# Patient Record
Sex: FEMALE | Race: WHITE | NOT HISPANIC OR LATINO | URBAN - METROPOLITAN AREA
[De-identification: names, ages, dates, MRNs, and addresses within clinical notes are randomized per-mention and may not be internally consistent; named-entity substitution may affect disease eponyms.]

---

## 2023-01-07 ENCOUNTER — APPOINTMENT (OUTPATIENT)
Dept: URGENT CARE | Age: 23
End: 2023-01-07

## 2023-01-07 DIAGNOSIS — Z56.6 OTHER PHYSICAL AND MENTAL STRAIN RELATED TO WORK: ICD-10-CM

## 2023-01-07 SDOH — HEALTH STABILITY - MENTAL HEALTH: OTHER PHYSICAL AND MENTAL STRAIN RELATED TO WORK: Z56.6

## 2023-01-10 LAB
GAMMA INTERFERON BACKGROUND BLD IA-ACNC: 0.02 IU/ML
M TB IFN-G BLD-IMP: NEGATIVE
M TB IFN-G CD4+ BCKGRND COR BLD-ACNC: 0 IU/ML
M TB IFN-G CD4+ BCKGRND COR BLD-ACNC: 0.01 IU/ML
MITOGEN IGNF BCKGRD COR BLD-ACNC: >10 IU/ML

## 2024-06-03 ENCOUNTER — APPOINTMENT (OUTPATIENT)
Dept: URGENT CARE | Age: 24
End: 2024-06-03

## 2025-01-27 ENCOUNTER — OFFICE VISIT (OUTPATIENT)
Dept: URGENT CARE | Age: 25
End: 2025-01-27
Payer: COMMERCIAL

## 2025-01-27 ENCOUNTER — HOSPITAL ENCOUNTER (EMERGENCY)
Facility: HOSPITAL | Age: 25
Discharge: HOME | End: 2025-01-27
Attending: EMERGENCY MEDICINE
Payer: COMMERCIAL

## 2025-01-27 VITALS
SYSTOLIC BLOOD PRESSURE: 93 MMHG | DIASTOLIC BLOOD PRESSURE: 57 MMHG | TEMPERATURE: 98.5 F | HEART RATE: 93 BPM | OXYGEN SATURATION: 98 % | RESPIRATION RATE: 18 BRPM

## 2025-01-27 VITALS
SYSTOLIC BLOOD PRESSURE: 120 MMHG | OXYGEN SATURATION: 100 % | DIASTOLIC BLOOD PRESSURE: 78 MMHG | RESPIRATION RATE: 18 BRPM | HEART RATE: 125 BPM | TEMPERATURE: 99 F

## 2025-01-27 DIAGNOSIS — K52.9 GASTROENTERITIS: Primary | ICD-10-CM

## 2025-01-27 DIAGNOSIS — R11.2 NAUSEA AND VOMITING, UNSPECIFIED VOMITING TYPE: Primary | ICD-10-CM

## 2025-01-27 LAB — GLUCOSE SERPL-MCNC: 129 MG/DL (ref 65–140)

## 2025-01-27 PROCEDURE — 99284 EMERGENCY DEPT VISIT MOD MDM: CPT | Performed by: EMERGENCY MEDICINE

## 2025-01-27 PROCEDURE — 99215 OFFICE O/P EST HI 40 MIN: CPT

## 2025-01-27 PROCEDURE — 99283 EMERGENCY DEPT VISIT LOW MDM: CPT

## 2025-01-27 PROCEDURE — 82948 REAGENT STRIP/BLOOD GLUCOSE: CPT

## 2025-01-27 RX ORDER — ONDANSETRON 8 MG/1
8 TABLET, ORALLY DISINTEGRATING ORAL ONCE
Status: COMPLETED | OUTPATIENT
Start: 2025-01-27 | End: 2025-01-27

## 2025-01-27 RX ORDER — ONDANSETRON 4 MG/1
4 TABLET, FILM COATED ORAL EVERY 8 HOURS PRN
Qty: 20 TABLET | Refills: 0 | Status: SHIPPED | OUTPATIENT
Start: 2025-01-27

## 2025-01-27 RX ORDER — ONDANSETRON 4 MG/1
4 TABLET, ORALLY DISINTEGRATING ORAL ONCE
Status: COMPLETED | OUTPATIENT
Start: 2025-01-27 | End: 2025-01-27

## 2025-01-27 RX ADMIN — ONDANSETRON 8 MG: 8 TABLET, ORALLY DISINTEGRATING ORAL at 20:39

## 2025-01-27 RX ADMIN — ONDANSETRON 4 MG: 4 TABLET, ORALLY DISINTEGRATING ORAL at 09:58

## 2025-01-27 NOTE — PROGRESS NOTES
[facility]        NAME: Miriam Payan is a 25 y.o. female  : 2000    MRN: [id number]  DATE: 2025  TIME: 11:07 AM    Assessment and Plan   Nausea and vomiting, unspecified vomiting type [R11.2]  1. Nausea and vomiting, unspecified vomiting type  ondansetron (ZOFRAN-ODT) dispersible tablet 4 mg    Fingerstick Glucose (POCT)    ondansetron (ZOFRAN) 4 mg tablet    Transfer to other facility    CANCELED: POCT urine dip    CANCELED: POCT urine HCG        Zofran 4mg PO given in office for active vomiting.  BS 129mg/dl. /62,   Pt with 2x diarrhea stools while in office; incontinent of stool.    Will check urine.Unable to provide urine specimen for HCG.  PO fluids following zofran admin; pt tolerated sips of ginger ale, continues with dizziness while supine.   Pt reports feeling slighting improved, VS: BP 93/57; HR 93  Recommend pt have further evaluation and possible IV hydration in ED.  Recommend pt not drive due to dizziness; requires further evaluation.  PT calling for ride.  Pt unsure if she will go to ED, would like to continue PO fluids at home with zofran.   Requested work note, same provided.   Strict ER precautions.   Pt family arrived to take her to ED. ADT order placed.  Pt ambulated out of  independently without incident.    Patient Instructions       Follow up with PCP in 3-5 days.  Proceed to  ER if symptoms worsen.    If tests have been performed at [facility], our office will contact you with results if changes need to be made to the care plan discussed with you at the visit.  You can review your full results on [facility].    Chief Complaint     Chief Complaint   Patient presents with   • Vomiting   • Dizziness   • Nausea   • Abdominal Pain   • Fatigue   • Generalized Body Aches     Symptoms started this morning.          History of Present Illness       Pt is a 25 year old female presenting with 5 hours of nausea, vomiting, chills, and diarrhea.  She  reports diffuse abd cramping and body aches.  She has not taken anything for her symptoms.  She reports dizziness and feeling lightheaded.  She denies sick contacts, has not eating out in restaurants or eaten anything abnormal for her.  LMP:  12/27/2024    Vomiting   Associated symptoms include abdominal pain, chills and dizziness. Pertinent negatives include no fever.   Dizziness  Associated symptoms include abdominal pain, chills, fatigue, nausea and vomiting. Pertinent negatives include no fever.   Nausea  Associated symptoms include abdominal pain, chills, fatigue, nausea and vomiting. Pertinent negatives include no fever.   Abdominal Pain  Associated symptoms include nausea and vomiting. Pertinent negatives include no fever.   Fatigue  Associated symptoms include abdominal pain, chills, fatigue, nausea and vomiting. Pertinent negatives include no fever.   Generalized Body Aches  Associated symptoms include fatigue, abdominal pain, nausea and vomiting. Pertinent negatives include no fever.       Review of Systems   Review of Systems   Constitutional:  Positive for chills and fatigue. Negative for fever.   Gastrointestinal:  Positive for abdominal pain, nausea and vomiting.   Neurological:  Positive for dizziness and light-headedness.         Current Medications       Current Outpatient Medications:   •  ondansetron (ZOFRAN) 4 mg tablet, Take 1 tablet (4 mg total) by mouth every 8 (eight) hours as needed for nausea or vomiting, Disp: 20 tablet, Rfl: 0  No current facility-administered medications for this visit.    Current Allergies     Allergies as of 01/27/2025 - Reviewed 01/27/2025   Allergen Reaction Noted   • Amoxicillin Hives 01/27/2025   • Penicillins Hives 01/27/2025            The following portions of the patient's history were reviewed and updated as appropriate: allergies, current medications, past family history, past medical history, past social history, past surgical history and problem list.      History reviewed. No pertinent past medical history.    History reviewed. No pertinent surgical history.    History reviewed. No pertinent family history.      Medications have been verified.        Objective   BP 93/57   Pulse 93   Temp 98.5 °F (36.9 °C)   Resp 18   SpO2 98%   No LMP recorded.       Physical Exam     Physical Exam  Vitals and nursing note reviewed.   Constitutional:       General: She is not in acute distress.     Appearance: She is well-developed and normal weight. She is ill-appearing.   HENT:      Head: Normocephalic.   Cardiovascular:      Rate and Rhythm: Tachycardia present.      Heart sounds: Normal heart sounds.   Pulmonary:      Effort: Pulmonary effort is normal.      Breath sounds: Normal breath sounds.   Abdominal:      General: Abdomen is flat. Bowel sounds are normal.      Palpations: Abdomen is soft.      Tenderness: There is generalized abdominal tenderness. There is no right CVA tenderness, left CVA tenderness, guarding or rebound. Negative signs include Mariscal's sign, Rovsing's sign, McBurney's sign, psoas sign and obturator sign.   Neurological:      Mental Status: She is alert.

## 2025-01-27 NOTE — LETTER
January 27, 2025     Patient: Miriam Payan   YOB: 2000   Date of Visit: 1/27/2025       To Whom it May Concern:    Miriam Payan was seen in my clinic on 1/27/2025. She may return to work on 1/29/2025 .    If you have any questions or concerns, please don't hesitate to call.         Sincerely,          SUZIE Lam        CC: No Recipients

## 2025-01-27 NOTE — PATIENT INSTRUCTIONS
Please go to [facility] Emergency Department for further evaluation of your dizziness with your nausea and vomiting.

## 2025-01-28 NOTE — ED ATTENDING ATTESTATION
"I, Edy Davis MD, saw and evaluated the patient. I have discussed the patient with the resident and agree with the resident's findings, Plan of Care, and MDM as documented in the resident's note, except where noted. All available labs and Radiology studies were reviewed.  I was present for key portions of any procedure(s) performed by the resident and I was immediately available to provide assistance.    At this point I agree with the current assessment done in the Emergency Department.  I have conducted an independent evaluation of this patient a history and physical is as follows:    26 yo female with no significant past medical history presents to the ED for evaluation of nausea, vomiting, and diarrhea x 1 day. The patient reports multiple episodes of NBNB vomiting and loose watery diarrhea since waking this morning. (+) Generalized abdominal \"cramping\", much improved since onset. (+) Myalgias and chills this morning but now resolved. The patient was evaluated in an urgent care this morning for the symptoms. She received Zofran and was able to tolerate some liquids prior to discharge. She was instructed to come to the ED if she vomited again however. This afternoon she vomited once more (NBNB) so she followed instructions and came to the ED. No fevers. No more episodes of diarrhea. She denies dizziness and lightheadedness. No other specific complaints.    ROS: per resident physician note    Gen: NAD, AA&Ox3  HEENT: PERRL, EOMI  Neck: supple  CV: (+) tachycardia  Lungs: CTA B/L  Abdomen: soft, (+) mild epigastric tenderness, no rebound or guarding  Ext: no swelling or deformity  Neuro: 5/5 strength all extremities, sensation grossly intact  Skin: no rash    ED Course  The patient is comfortable appearing with stable vital signs other than a mild tachycardia. Exam is significant for some slight epigastric tenderness. Presentation is most consistent with a viral gastroenteritis. The patient is adamant that " "she does not want an IV or blood work \"unless it is absolutely necessary\". She would prefer to trial oral medications instead. ODT Zofran administered, plan for PO challenge. Disposition and further treatment per reassessment. Will continue to monitor in the ED.      Critical Care Time  Procedures   "

## 2025-01-29 NOTE — ED PROVIDER NOTES
Time reflects when diagnosis was documented in both MDM as applicable and the Disposition within this note       Time User Action Codes Description Comment    1/27/2025  9:04 PM King Marsh Add [K52.9] Gastroenteritis           ED Disposition       ED Disposition   Discharge    Condition   Stable    Date/Time   Mon Jan 27, 2025  9:04 PM    Comment   Miriam Schuyler discharge to home/self care.                   Assessment & Plan       Medical Decision Making  25-year-old female with history of nausea and vomiting presents to the emergency department today for evaluation of ongoing symptoms.  Patient history examination most consistent with viral gastritis.  There is no indication to start IV resuscitation as patient is feeling well and tolerating oral fluids.  Zofran given here in the emergency department and patient continues to tolerate p.o. with no episodes of emesis.  Patient would like to be discharged at this time as she is feeling better.  We discussed worrisome symptoms which would require her to seek repeat evaluation to which she verbalized understanding.  She remained hemodynamically stable while under my care and is appropriate for discharge home with outpatient follow-up instructions.    Risk  Prescription drug management.             Medications   ondansetron (ZOFRAN-ODT) dispersible tablet 8 mg (8 mg Oral Given 1/27/25 2039)       ED Risk Strat Scores                                              History of Present Illness       Chief Complaint   Patient presents with    Vomiting     Pt c/o N/V/D since this morning. Pt seen at urgent care this morning        No past medical history on file.   No past surgical history on file.   No family history on file.       E-Cigarette/Vaping      E-Cigarette/Vaping Substances      I have reviewed and agree with the history as documented.     25-year-old female with a history of nausea and vomiting presents to the emergency department for evaluation of ongoing  symptoms.  Patient tells me that she is only here because the previous provider that she saw told her to come to the emergency department if she vomits again.  She states she was seen earlier and given Zofran with good results.  This afternoon however she had 1 episode of emesis and came here per the instructions that were given to her.  She has no complaints at this time other than vague sensation of nausea.  Has not had diarrhea.  Had a usual bowel movement this afternoon.  Vomit does not appear bloody or bilious.  She is tolerating small amounts of food and is regularly able to drink water without vomiting.  She states that she does not want an IV if she does not need it.         Review of Systems   Constitutional:  Positive for appetite change and fatigue. Negative for activity change, chills and fever.   HENT:  Negative for congestion, facial swelling, sinus pressure, sinus pain and sore throat.    Eyes:  Negative for visual disturbance.   Respiratory:  Negative for cough and shortness of breath.    Cardiovascular:  Negative for chest pain.   Gastrointestinal:  Positive for abdominal pain, nausea and vomiting. Negative for blood in stool and diarrhea.   Genitourinary:  Negative for dysuria and urgency.   Musculoskeletal:  Negative for myalgias.           Objective       ED Triage Vitals [01/27/25 1744]   Temperature Pulse Blood Pressure Respirations SpO2 Patient Position - Orthostatic VS   99 °F (37.2 °C) (!) 125 120/78 18 100 % Lying      Temp Source Heart Rate Source BP Location FiO2 (%) Pain Score    Temporal Monitor Right arm -- No Pain      Vitals      Date and Time Temp Pulse SpO2 Resp BP Pain Score FACES Pain Rating User   01/27/25 1744 99 °F (37.2 °C) 125 100 % 18 120/78 No Pain -- KV            Physical Exam  Vitals reviewed.   Constitutional:       General: She is not in acute distress.     Appearance: Normal appearance. She is not ill-appearing.   HENT:      Head: Normocephalic and atraumatic.       Nose: Nose normal.      Mouth/Throat:      Mouth: Mucous membranes are moist.      Pharynx: Oropharynx is clear. No oropharyngeal exudate or posterior oropharyngeal erythema.   Cardiovascular:      Rate and Rhythm: Normal rate and regular rhythm.      Pulses: Normal pulses.      Heart sounds: Normal heart sounds. No murmur heard.     No friction rub.   Pulmonary:      Effort: Pulmonary effort is normal. No respiratory distress.      Breath sounds: Normal breath sounds.   Abdominal:      General: Abdomen is flat.      Palpations: Abdomen is soft.      Tenderness: There is abdominal tenderness.      Comments: Mild epigastric tenderness to palpation without guarding.   Skin:     General: Skin is warm and dry.      Coloration: Skin is not jaundiced.      Findings: No bruising, erythema, lesion or rash.   Neurological:      Mental Status: She is alert and oriented to person, place, and time.         Results Reviewed       None            No orders to display       Procedures    ED Medication and Procedure Management   Prior to Admission Medications   Prescriptions Last Dose Informant Patient Reported? Taking?   ondansetron (ZOFRAN) 4 mg tablet   No No   Sig: Take 1 tablet (4 mg total) by mouth every 8 (eight) hours as needed for nausea or vomiting      Facility-Administered Medications Last Administration Doses Remaining   ondansetron (ZOFRAN-ODT) dispersible tablet 4 mg 1/27/2025  9:58 AM 0        Discharge Medication List as of 1/27/2025  9:05 PM        CONTINUE these medications which have NOT CHANGED    Details   ondansetron (ZOFRAN) 4 mg tablet Take 1 tablet (4 mg total) by mouth every 8 (eight) hours as needed for nausea or vomiting, Starting Mon 1/27/2025, Normal           No discharge procedures on file.  ED SEPSIS DOCUMENTATION   Time reflects when diagnosis was documented in both MDM as applicable and the Disposition within this note       Time User Action Codes Description Comment    1/27/2025  9:04 PM Salma  King Add [K52.9] Gastroenteritis                  King Marsh MD  01/28/25 1457